# Patient Record
Sex: FEMALE | Race: WHITE | NOT HISPANIC OR LATINO | Employment: OTHER
[De-identification: names, ages, dates, MRNs, and addresses within clinical notes are randomized per-mention and may not be internally consistent; named-entity substitution may affect disease eponyms.]

---

## 2017-08-19 ENCOUNTER — HEALTH MAINTENANCE LETTER (OUTPATIENT)
Age: 62
End: 2017-08-19

## 2019-11-06 ENCOUNTER — HEALTH MAINTENANCE LETTER (OUTPATIENT)
Age: 64
End: 2019-11-06

## 2020-02-16 ENCOUNTER — HEALTH MAINTENANCE LETTER (OUTPATIENT)
Age: 65
End: 2020-02-16

## 2020-11-29 ENCOUNTER — HEALTH MAINTENANCE LETTER (OUTPATIENT)
Age: 65
End: 2020-11-29

## 2021-04-10 ENCOUNTER — HEALTH MAINTENANCE LETTER (OUTPATIENT)
Age: 66
End: 2021-04-10

## 2021-09-19 ENCOUNTER — HEALTH MAINTENANCE LETTER (OUTPATIENT)
Age: 66
End: 2021-09-19

## 2021-11-14 ENCOUNTER — HEALTH MAINTENANCE LETTER (OUTPATIENT)
Age: 66
End: 2021-11-14

## 2022-05-01 ENCOUNTER — HEALTH MAINTENANCE LETTER (OUTPATIENT)
Age: 67
End: 2022-05-01

## 2022-11-21 ENCOUNTER — HEALTH MAINTENANCE LETTER (OUTPATIENT)
Age: 67
End: 2022-11-21

## 2023-02-22 ENCOUNTER — TRANSCRIBE ORDERS (OUTPATIENT)
Dept: OTHER | Age: 68
End: 2023-02-22

## 2023-02-22 DIAGNOSIS — R41.3 MEMORY LOSS: Primary | ICD-10-CM

## 2023-05-05 ENCOUNTER — OFFICE VISIT (OUTPATIENT)
Dept: NEUROPSYCHOLOGY | Facility: CLINIC | Age: 68
End: 2023-05-05
Attending: INTERNAL MEDICINE
Payer: MEDICARE

## 2023-05-05 DIAGNOSIS — R41.89 SUBJECTIVE COGNITIVE IMPAIRMENT: ICD-10-CM

## 2023-05-05 DIAGNOSIS — F33.0 MAJOR DEPRESSIVE DISORDER, RECURRENT EPISODE, MILD (H): Primary | ICD-10-CM

## 2023-05-05 DIAGNOSIS — F41.1 GENERALIZED ANXIETY DISORDER: ICD-10-CM

## 2023-05-05 DIAGNOSIS — R41.3 MEMORY LOSS: ICD-10-CM

## 2023-05-05 PROCEDURE — 96139 PSYCL/NRPSYC TST TECH EA: CPT | Performed by: CLINICAL NEUROPSYCHOLOGIST

## 2023-05-05 PROCEDURE — 90791 PSYCH DIAGNOSTIC EVALUATION: CPT | Performed by: CLINICAL NEUROPSYCHOLOGIST

## 2023-05-05 PROCEDURE — 96132 NRPSYC TST EVAL PHYS/QHP 1ST: CPT | Performed by: CLINICAL NEUROPSYCHOLOGIST

## 2023-05-05 PROCEDURE — 96138 PSYCL/NRPSYC TECH 1ST: CPT | Performed by: CLINICAL NEUROPSYCHOLOGIST

## 2023-05-05 PROCEDURE — 96133 NRPSYC TST EVAL PHYS/QHP EA: CPT | Performed by: CLINICAL NEUROPSYCHOLOGIST

## 2023-05-05 NOTE — LETTER
5/5/2023      RE: Jocelyn Keenan  [address]  [place] [address]       The patient was seen for neuropsychological evaluation at the request of Xiomara Silva MD for the purposes of diagnostic clarification and treatment planning. 174 minutes of test administration and scoring were provided by this writer. Please see Dr. Donna Long's report for a full interpretation of the findings.    Roxanna Faria   Psychometrist        Donna Long PsyD

## 2023-05-05 NOTE — CONFIDENTIAL NOTE
CONFIDENTIAL NEUROPSYCHOLOGICAL EVALUATION  **This is a medical document intended to be read within the context of the larger medical chart and for communication with the referring provider.  It is written in medical language and may contain abbreviations that are unfamiliar.  Please consult the provider for further clarification.**    Name:  Jocelyn Keenan  (Kailyn)  YOB: 1955  Date of Assessment: May 5, 2023  Referring Provider: Xiomara Silva MD  Occupation:   Education: 17 yrs  Handedness: left handed    Reason for Referral: Jocelyn Keenan is a 68 year old female  who was referred for a neuropsychological evaluation for subjective cognitive complaints within the context of a number of psychosocial factors.       SUMMARY AND CLINICAL IMPRESSIONS: See below for relevant background information and detailed test results.  See separate abstract for a list of neuropsychological measures and test scores.     Results of the testing are completely normal.  Within the context of estimated average to above average pre-morbid functioning, Kailyn demonstrated areas of strength on a task of novel problem solving (as aspect of executive functioning) and confrontation naming. All other cognitive domains were within expected ranges. Language abilities, visuospatial perception and reasoning, attention span, working memory, processing speed, and all other areas of executive functioning were within average ranges.  All tasks of learning and memory demonstrated good ability to learn new verbal and visual material, normal rates of consolidation and independent retrieval, with intact recognition memory.      With regards to diagnosis, I agree with Dr. Silva's conceptualization:  It's very likely that demarcuss cognitive challenges arise from severe levels of affective distress, which she reported during the clinical interview today and was noted on self-report measures.  When someone  experiences affective distress to the level as reported by Kailyn, cognitive resources can be depleted, leaving fewer resources for basic daily activities such as forming new memories or retrieving memories from long-term storage. The constant feelings of sadness, hopelessness, stress, and fatigue can make it challenging to focus on tasks or write new memories.  Additionally, depression can affect executive functions such as problem-solving, planning, and organizing - exacerbating memory challenges.  As depression and anxiety and stress are better managed, Kailyn may notice and improvement in her daily functioning.  It is highly recommended that Kailyn work with a therapist on symptoms of depression and anxiety, but also target sleep hygiene techniques.     DIAGNOSES  Major Depressive Disorder - recurrent  Generalized Anxiety Disorder  Subjective Cognitive Concerns    Thank you for allowing me to participate in Jocelyn JAVID Keenan.   I hope this report is helpful to all those involved.  I would be happy to discuss these results in detail or answer any other questions.      Donna Long PsyD,   Clinical Neuropsychologist    RELEVANT BACKGROUND INFORMATION  Informed consent  was obtained after discussing the purpose and procedures of the evaluation, as well as aspects of confidentiality and effort/engagement.  Background information was obtained from a clinical interview with Kailyn and her , as well as review of available medical records.     HISTORY OF PRESENTING ILLNESS:  Per outpatient consultation with franc Silva MD, (10/11/2022), Kailyn was seen for a MDD - recurrent episode with anxiety, insomnia, fibromyalgia, and a number of psychosocial stressors.  She is taking Cymbalta and Lyrica, as well as trazodone for sleep.  She reported a number of cognitive concerns including decreased memory, word-finding concerns, and difficulties recalling names.  She will reach over to grab something and nothing will  "be there.  Her memory upon office screening is within normal limits (MoCA = 24/30) a bit lower than prior testing competed in 2018 (28/30).  She missed points on sentence repetition, verbal fluency, independent retrieval of delayed recall, and the date. It was recommended that she follow up with repeat metabolic labs, MRI of the brain (below) and full neuropsychological testing - prompting this evaluation.      Current Clinical Interview:   Kailyn reported she has been forgetting a lot over the past few years with a subtle to mild progression.  She noted that she had prior cognitive test (she called it the \"easy testing\") and she reported the  stated she didn't do as well as she should have. She has increased difficulty putting names and faces together and finding the right word.  She noted she \"does get there eventually\" but it can take awhile.  For example, it took her about 10-15 second to recall her address. She has difficulties following conversation, particularly if she is not involved or the subject matter is not interesting/doesn't pertain to her.   She also noted she will leave the oven on.  She will ask the same question multiple times.  She has difficulties making decisions, but she has always been that way. With regards to ADLs, she is an  and denied getting lost or any moving violations. Her  is responsible for the household fiances. She hasn't fallen victim to any scams.     Of note, her  denied noting any significant cognitive changes.  He hasn't noticed any significant memory \"blips\" or changes in word-finding.  Her  denied any significant changes in iADLs, although he noted a singular event where she forgot to put refrigerated things away immediately when coming home from grocery shopping.     She reported significantly increased stress levels over the past couple of years.  Her  has his own medical challenges (cancer diagnosis), and her " "mother passed which resurfaced many challenges familial dynamics.     Kailyn has had a number of physical challenges recently including both knees replaced and a spinal fusion surgery in her back (which she reported didn't work completely).  She reported she is in constant pain including back pain, neuropathy in her legs, and rotator cuff surgery.  She stated she hasn't been able to do things with her  for the last couple of years due to both's medical challenges.      HEALTH HABITS, BEHAVIORS AND MOOD:   Description of current mood:  \"Sad.\"  She cited a number of causes including her mother's recent passing, her daughter is a recovering alcoholic and Kailny has had to help raise 3 boys with behavioral challenges (ASD, unspecified personality disorder, and a \"confrontation problem\") that contributes to a high level of stress that she feels she is constantly under.  She stated she has been sad for a long time.  She is taking duloxetine for mood management.     Appetite: She does have a history of disordered eating/binge eating behaviors such as \"sneaking\" food at night.  She did have a prior bariatric surgery but recently gained it al back with her last hip surgery.  She denied any changes in her sense of taste or smell.     Sleep/Energy: \"I dont sleep. I get probably 5 hours\"  The quality and quantity of her sleep is impacted by her pain.  She usually sleeps in her recliner and uses trazodone to help with sleep onset.  She doesn't take it consistently though.  She feels fatigue much of the time and will doze off for upwards of an hour a day.  She did have a prior sleep study which reportedly indicated she does not need a CPAP machine. She reportedly does snore a little.     Exercise: Not a lot; take a dog for the walk -    Chronic Pain: Has oxycodone but \"hates them\"      Tobacco use:  Denied.     Alcohol use:  Rarely ( cant);[place]    Other Drugs: Denied.     Hallucinations: " Described illusionary experiences mentioned above.  She will see things or reach for things in her periphery.  She did have cataract surgery a couple of years ago.      Suicidal ideation:  Remote passive suicidal ideation (about 45 years ago), none currently.     MEDICAL/PSYCHIATRIC/SURGICAL HISTORY: floaters, tinnitus  Patient Active Problem List   Diagnosis     Temporomandibular joint disorder     Other combinations of endocrine dysfunction     Other vitamin B12 deficiency anemia     Myalgia and myositis     Advanced directives, counseling/discussion     CARDIOVASCULAR SCREENING; LDL GOAL LESS THAN 130     Obesity       Past Medical History:   Diagnosis Date     Other combinations of endocrine dysfunction      Other vitamin B12 deficiency anemia      Temporomandibular joint disorders, unspecified        Past Surgical History:   Procedure Laterality Date     Z NONSPECIFIC PROCEDURE      Gastric Bypass     ZZC NONSPECIFIC PROCEDURE      Carpal Tunnel x 2     ZZC NONSPECIFIC PROCEDURE      (B) knee scopes     Z NONSPECIFIC PROCEDURE      LB surgery     Z NONSPECIFIC PROCEDURE      Tubal ligation       Family History:  Uncle passed away from [place], mother passed (96) but didn't have dementia, grandmother did have dementia (maternal),   Family History   Problem Relation Age of Onset     Cancer Mother         colon cancer     Cancer Father         lung cancer, emphysema       IMAGING:  Brain MRI completed at [facility] (2/25/2023) revealed:    IMPRESSION:     1. No acute intracranial pathology.   2. Slightly increased mild chronic small vessel ischemic changes as above.   3. Slightly increased mild volume loss.   4. Stable small probable meningioma along the superior right frontal falx.       MEDICATIONS: Jocelyn has a current medication list which includes the following prescription(s): albuterol, cyclobenzaprine, duloxetine hcl, mometasone, trazodone, and vitamin b-12..    SOCIAL/DEVELOPMENTAL/OCCUPATIONAL  HISTORY: Kailyn was born and raised in [place].  She denied any pre- or  complications.  She reportedly met all developmental milestones on time.  She stated school was harder for her as she was dyslexic.  She reversed numbers and letters, and still struggles with writing.  She struggled with reading comprehension as well (although she enjoys reading now). She graduated high school and college, and completed enough post-graduate education to have a masters (17 years of school).  She was a  - although she struggled to maintain employment.  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  She would occasionally get hired for a year or two.  She ultimately retired when on disability due to difficulties with her back and a workman's comp claim.  She supports herself through workman's comp and SSDI.  She has been  almost 49 years and had two adult daughters.      BEHAVIORAL OBSERVATIONS: Kailyn arrived at her appointment, accompanied by her .  There were no gross or fine motor impairments noted in casual observation.  Speech was normal. Hearing and vision were adequate for the testing environment.  Kailyn appeared anxious about testing, and benefited from reassurances and support.  Affect appeared consistent with anxious to euthymic mood. Thoughts were goal-directed and linear.  She appeared somewhat inpatient when directions were being presented, as she easily anticipated task directions and did not require significant repetition or simplification.     TEST RESULTS: Please use the following table for reference.   Percentile Ranks Descriptor   <2nd Percentile Extremely Low Range   3rd - 9th Percentile Borderline Range   10th - 16th Percentile Below Average Range   17th - 24th Percentile Low Average   25th - 75th Percentile Average   76th - 90th Percentile Above Average   91st - 97th Percentile Superior   >98th Percentile Very Superior      Performance  Validity:  Kailyn performed within expected ranges on embedded measures of performance validity.  Taken together with the behavioral observations above, the following results are considered an accurate representation of her current neuropsychological status.     Pre-Morbid Functioning: Kailyn performed within the above average on a measure of single-word reading.  Within the context of the demographic information outlined above, it is estimated that Kailyn's pre-morbid functioning be at least within the average to above average range.     Language Skills: Verbal abstraction was high average. Confrontation naming was superior. Phonemic verbal fluency was above average and semantic verbal fluency was average.  Overall, no concerns.     Visuospatial: Block construction was superior. Copy of a complex figure was normal with only minor skewed details likely related to difficulties in planning.  Overall, no concerns.     Attention/Working Memory/Processing Speed: Simple attention was average, and working memory ranged from average to superior.  Processing speed was average.     Learning/Memory: List learning was above average, characterized by a normal learning curve.  She was generally able to recall all that she learned, and recognition memory was normal (all hits, no false positive errors). Narrative learning and memory was average to above average, with normal recognition memory.  She demonstrated a steep learning curve on a task of visual learning, generally performing in the average range. Delayed recall was average, and recognition memory was normal with no executive interference.     Executive Functioning: Rapid alternating attention was average. Response inhibition was average for speed of responding as well as number of errors made.  Novel problem-solving was superior.  Kailyn was quite skilled at generating and effectively testing different hypothesis in order to solve a self-directed problems.  No areas of concern.      Psychological Functioning: Kailyn endorsed mild levels of stress, moderate levels of depression, and extremely severe levels of anxiety. Severe symptoms (rated 3 out of 3) included dryness of mouth, low initiation, over-reacting to situations, worrisome about panic, intolerant of things that got in her way.  Moderate symptoms (rated 2 out of 3) included: feeling panic and down-hearted and blue.  Mild symptoms included difficulties relaxing, anhedonia, difficulties breathing, nervous energy, feeling like she has nothing to look forward to, difficulties relaxing, being unenthusiastic about anything, and a fast beating heart.     The Personality Assessment inventory assesses different phenotypes of emotional, physical, and interpersonal functioning when administered in addition to the clinical interview.  It also has several embedded validity indices that assess potential impression distortions along with accuracy in responding.  Kailyn appeared to attend to the measure and answer consistently across similar items.  There were no indications of potential impression distortions.    There were a number of significant elevations on the clinical scales, indicating pervasive challenges likely effecting many, if not all, areas of her life.  She endorsed a number of somatic complaints and overall feels her life is disrupted by her degree of physical impairment. She endorsed a number of symptoms consistent with depressive experiences including thoughts of worthlessness, hopelessness, and feeling like she is a failure. She openly admits to sadness, anhedonia, as well as physical symptoms (disrupted sleep, low libido).  She reported clinically concerning levels of anxiety, as well as maladaptive patterns at controlling the anxiety. She also reported cognitive difficulties such as poor concentration and distractibility. This would be consistent with the clinical interview.     Procedures Administered by Psychologist: Clinical  Interview with Jocelyn and her , review of available medical records, test selection, interpretation, preparation of written report, and feedback. Please see nursing note for procedures administered by psychometrist.      Tests Administered by psychometrist:  Performance Validity Measures, NAB Orientation, ACS TOPF, WAIS-IV (Selected Subtests), HVLT-R, WMS-IV LM, BVMT-R, RCFT (Copy only), BNT, COWAT/Animals, Trails A&B, D-kEFS Color-Word Interference, WCST-128, ILAN-21    Activities included in this evaluation CPT Code Time Units   Psychological Diagnostic Interview 34709 - 1   Neuropsychology Professional Time [id number] 1   Add on 37554  1   Neuropsychologist Admin/Scoring Tests [id number]     Add On 52197     Psychometrician Time - Test 34638 174 1   Admin/Scoring [id number]  5   DX:

## 2023-05-05 NOTE — PROGRESS NOTES
The patient was seen for neuropsychological evaluation at the request of Xiomara Silva MD for the purposes of diagnostic clarification and treatment planning. 174 minutes of test administration and scoring were provided by this writer. Please see Dr. Donna Long's report for a full interpretation of the findings.    Roxanna Faria   Psychometrist

## 2023-05-05 NOTE — LETTER
5/5/2023      RE: Jocelyn Keenan  [address]  [place] [address]       The patient was seen for neuropsychological evaluation at the request of Xiomara Silva MD for the purposes of diagnostic clarification and treatment planning. 174 minutes of test administration and scoring were provided by this writer. Please see Dr. Donna Long's report for a full interpretation of the findings.    Roxanna Faria   Psychometrist      Donna Long PsyD

## 2023-05-05 NOTE — LETTER
5/5/2023      RE: Jocelyn Keenan  [address]  [place] [address]       The patient was seen for neuropsychological evaluation at the request of Xiomara Silva MD for the purposes of diagnostic clarification and treatment planning. 174 minutes of test administration and scoring were provided by this writer. Please see Dr. Donna Long's report for a full interpretation of the findings.    Roxanna Faria   Psychometrist        Donna Long PsyD

## 2023-05-22 NOTE — PROGRESS NOTES
Provider: AL      Tech: OK      Patient Name: Kailyn Keenan      : 1955      MRN: [id number]      URIOSTEGUI: 2023      Age: 68      Education: 17      Ethnicity: C      Handedness: Left      Station: OP             NEUROPSYCHOLOGICAL TESTS RAW SCORE STANDARDIZED SCORE* %deven   COGNITIVE STATUS       NAB Orientation (/) 29 --            INTELLECTUAL ABILITY RAW SCORE STANDARDIZED %deven   WAIS-IV        VCI -- SS= 0        Similarities 28 ss= 12 75%   MIROSLAVA -- SS= 0    Block Design 47 ss= 14 91%   WMI -- SS= 0    Digit Span 31 ss= 13 84%   PSI -- SS= 108 70%   Symbol Search 30 ss= 13 84%   Coding 52 ss= 10 50%          PREMORBID ESTIMATE RAW SCORE STANDARDIZED SCORE* %deven   Test of Premorbid Functioning (TOPF)  Actual SS= 113 81%    56 Pred SS= 113 --     E-FSIQ= 111 --          ATTENTION/CONC. & PROC SPEED RAW SCORE STANDARDIZED SCORE* %deven   WAIS-IV       Digit Span Total 31 ss= 13 84%   Digit Span Forward (LDSF=6) 10 ss= 10 50%   Digit Span Backward (LDSB=6) 10 ss= 13 84%   Digit Span Sequencing (LDSS=7) 11 ss= 15 95%   Reliable Digit Span (RDS) 10 --  --          MEMORY RAW SCORE STANDARDIZED SCORE* %deven   Verbal       David Verbal Learning Test - Revised (HVLT-R; Form 1)      Trial 1 9 -  -   Trial 2 10 -  -   Trial 3 11 -  -   Total Trials 1-3 30 TS= 62 88%   Delayed Recall 9 TS= 52 58%   Recognition Hits 12 -  -   Recognition False Alarms 0 -  -   Recognition Discriminability 12 TS= 59 82%   WMS-IV (OA)       Logical Memory I 34 ss= 11 63%   Logical Memory II 26 ss= 13 84%   Logical Memory Recognition 21 -  >75%   Visual        Brief Visual Memory Test - Revised (BVMT-R; Form 1)      Trial 1 3 --     Trial 2 8 --     Trial 3 12 --     Trials 1-3 23 TS= 53 62%   Learning 9 TS= 78 >99%   Delayed Recall 11 TS= 64 92%   Percent Retained 92% --  >16%   Recognition Hits 6 --  >16%   Recognition False Alarms 0 --  >16%   Discrimination Index 6   >16%   Wong Complex Figure Test (RCFT)       Time to Copy 248  --  >16%   Copy 33 --  >16%          LANGUAGE RAW SCORE STANDARDIZED SCORE* %deven   [place] Naming Test s,r,e 59 TS= 66 95%   Phonemic Cue (Correct; Administered) 0; 1      COWAT Verbal Fluency 50 TS= 57 76%   Repetition Errors 0      Set Loss Errors 0      Animal Naming 18 TS= 45 31%   Repetition Errors 0      Set Loss Errors 0             VISUOSPATIAL RAW SCORE STANDARDIZED SCORE* %deven   Wong Complex Figure       Copy Total 33 --  >16   Copy Time 248 --  >16          EXECUTIVE FUNCTIONS RAW SCORE STANDARDIZED SCORE* %deven   Trail Making Test (Errors; Time)        Part A s,r,e 0; 33 TS= 52 58%   Part B s,r,e 0; 70 TS= 54 66%   DKEFS Color-Word Interference Test       Color Naming 41 ss= 7 16%   Word Reading 24 ss= 11 63%   Inhibition 76 ss= 10 50%   Inhibition Total Errors 1 ss= 12 75%   Inhibition/Switching 74 ss= 11 63%   Inhibition/Switching Total Errors 0 ss= 13 84%   [place] Card Sorting Test (WCST-128)       Categories Completede 6 --  >16%   Trials to Complete 1st Category 11 --  >16%   Total Errorse 9 TS= 69 97%   Perseverative Responsese 7 TS= 61 87%   Failure To Maintain Sete >16 --  >16%   % Conceptual Level Responses 62 TS= 70 98%          MOOD AND PERSONALITY RAW SCORE INTERPRETATION %deven   Personality Assessment Inventory (JAYDEN)       Depression, Anxiety, Stress Scale (ILAN-21)       Depression 16 Moderate  --   Anxiety 20 Extemely Severe --   Stress 18 Mild  --          * All standardized scores are adjusted for age. Superscripts denote additional adjustment for (s)ex, (r)ace/ethnicity, (l)anguage, and/or (e)ducation.   ** Descriptive ranges are based on American Academy of Clinical Neuropsychology (2020) consensus guidelines, or test manuals where appropriate.    WNL = within normal limits. DC = discontinued due to patient s inability to complete the test.     Standardized scores: TS = T-score; mean = 50, standard deviation =10; z = z-score; mean = 0, standard deviation = 1; ss = scaled score; mean  = 10, standard deviation = 3; MAS = [place] Older Adult Normative Study age adjusted scaled score; mean = 10, standard deviation = 3; SS = standard score; mean = 100, standard deviation = 15.

## 2023-05-26 ENCOUNTER — TELEPHONE (OUTPATIENT)
Dept: NEUROPSYCHOLOGY | Facility: CLINIC | Age: 68
End: 2023-05-26
Payer: MEDICARE

## 2023-11-26 ENCOUNTER — HEALTH MAINTENANCE LETTER (OUTPATIENT)
Age: 68
End: 2023-11-26

## 2025-01-04 ENCOUNTER — HEALTH MAINTENANCE LETTER (OUTPATIENT)
Age: 70
End: 2025-01-04